# Patient Record
(demographics unavailable — no encounter records)

---

## 2025-03-04 NOTE — ADDENDUM
[FreeTextEntry1] : Entered by Diaz Sargent, acting as scribe for Dr. Crow Iglesias. The documentation recorded by the scribe accurately reflects the service I personally performed and the decisions made by me.

## 2025-03-04 NOTE — LETTER BODY
[FreeTextEntry1] : Thelma Dan [place] [id number] [place] [address] [phone]  Dear Dr. Dan,  Reason for visit: Microscopic hematuria  This is a 69 year-old man with microscopic hematuria. His hematuria evaluation in May 2024 was negative for hematuria. Patient returns today for follow-up. Since he was last seen, he denies any gross hematuria, dysuria or urinary incontinence. The patient denies any aggravating or relieving factors. The patient denies any interference of function. The patient is entirely asymptomatic. All other review of systems are negative. He has no cancer in her family medical history. He has no previous surgical history. Past medical history, family history and social history were inquired and were noncontributory to current condition. The patient does not use tobacco or drink alcohol. Medications and allergies were reviewed. He has no known allergies to medication. Patient is an .  On examination, the patient is a well-appearing gentleman in no acute distress. He is alert and oriented and follows commands. He has normal mood and affect. He is normocephalic. Oral no thrush. Neck is supple. Respirations are unlabored. His abdomen is soft and nontender. Liver is nonpalpable. Bladder is nonpalpable. No CVA tenderness. Neurologically he is grossly intact. No peripheral edema. Skin without gross abnormality.    Assessment: Microscopic hematuria.  I counseled the patient. His hematuria evaluation last year was negative. I recommended the patient repeat PSA, BMP, and urinalysis today to establish baseline. I answered the patient's questions. The risks and expected outcomes were discussed. The patient will follow up as directed and contact me with any questions or concerns. Thank you for the opportunity to participate in the care of Mr. KIM. I will keep you updated on his progress.  Plan: PSA. BMP. Urinalysis. Follow up in 1 year.  I spent 20-minutes time today on all issues related to this encounter on today date of service including non face to face time.

## 2025-03-04 NOTE — HISTORY OF PRESENT ILLNESS
[FreeTextEntry1] : Follow-up for microscopic hematuria, negative studies in May 2024, asymptomatic PSA 1.14 in May 2024 Compliant of insomnia at night, suggest to follow up with PCP   Please refer to [facility] Consult Note.
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
## 2025-03-23 NOTE — REASON FOR VISIT
[FreeTextEntry1] : 67-year-old male with AFIB, stroke s/p thrombectomy, LV dysfunction EF 40%, aortic aneurysm (4.5 cm), HTN, gout, presents for followup.    Patient was last seen on 7/6/23 -> Pt is here for cardiac clearance prior to right knee surgery on 7/20/23 with Dr. Ignacio. Patient has been stable. Patient denies CP, SOB, or palpitations. ECG showed AFIB with no ischemic changes. Recent echo showed normal LV function. Patient is cleared for surgery and anesthesia. He may hold Eliquis and Farxiga for 3 days prior to surgery.  Pt is on Eliquis 5 mg BID and Atorvastatin 40 mg for stroke prevention. He is on Carvedilol 6.25 mg BID, Losartan 50 mg for HTN. He is on Farxiga for LV dysfunction.    Patient underwent an echocardiogram 2/23/23  and it showed normal LV function, marked LAE, moderate LADARIUS, mild aortic root dilatation (4.4 cm), without significant valvular pathology.   Patient underwent an echocardiogram 3/4/22 and it showed mild-moderate LV dysfunction, moderate LVH, marked LA and RA enlargement, without significant valvular pathology.   Patient underwent a treadmill stress test 3/4/22 and completed 6 minutes of Jaya protocol. There was no ECG evidence of ischemia. Following treadmill stress, there was no echocardiographic evidence of ischemia.   Patient underwent an echocardiogram 7/6/17 and it showed moderate global LV dysfunction with EF of 35%.     He underwent a pharmacologic nuclear stress test 7/28/17 and it showed normal myocardial perfusion with EF 40%.

## 2025-03-23 NOTE — DISCUSSION/SUMMARY
[FreeTextEntry1] : 67-year-old male with AFIB, stroke s/p thrombectomy, LV dysfunction EF 40%, aortic aneurysm (4.5 cm), HTN, gout, presents for followup.  \par  \par  Patient was last seen on 5/24/23 - Patient is planning right knee surgery. Pt denies CP, SOB, or palpitations. Pt reports dizziness/lightheadedness once in a while. \par  \par  He is on Eliquis 5 mg BID and Atorvastatin 40 mg for stroke prevention. He is on Carvedilol 6.25 mg BID, Amlodipine 2.5 mg QD, Losartan 50 mg for HTN.  \par  \par  Patient underwent an echocardiogram 2/23/23  and it showed normal LV function, marked LAE, moderate LADARIUS, mild aortic root dilatation (4.4 cm), without significant valvular pathology. \par  \par  Patient underwent an echocardiogram 3/4/22 and it showed mild-moderate LV dysfunction, moderate LVH, marked LA and RA enlargement, without significant valvular pathology. \par  \par  Patient underwent a treadmill stress test 3/4/22 and completed 6 minutes of Jaya protocol. There was no ECG evidence of ischemia. Following treadmill stress, there was no echocardiographic evidence of ischemia. \par  \par  Patient underwent an echocardiogram 7/6/17 and it showed moderate global LV dysfunction with EF of 35%.   \par  \par  He underwent a pharmacologic nuclear stress test 7/28/17 and it showed normal myocardial perfusion with EF 40%. \par  \par  7/6/23 - Pt is here for cardiac clearance prior to right knee surgery on 7/20/23 with Dr. Ignacio.  Patient has been stable.  Patient denies CP, SOB, or palpitations.  ECG showed AFIB with no ischemic changes.  Recent echo showed normal LV function.  Patient is cleared for surgery and anesthesia.  He may hold Eliquis and Farxiga for 3 days prior to surgery.\par  \par  (1) Cardiac clearance prior to knee surgery - Patient has stable AFIB.  Patient denies exertional symptoms.  Patient underwent an echocardiogram 2/23/23  and it showed normal LV function, marked LAE, moderate LADARIUS, mild aortic root dilatation (4.4 cm), without significant valvular pathology.  Patient is cleared for surgery and anesthesia.  He may hold Eliquis and Farxiga for 3 days prior to surgery.\par  \par  (2) AFIB - Patient has been stable.  I advised patient to continue Eliquis 5 mg BID for stroke prevention and Carvedilol 6.25 mg BID for rate control.  \par  \par  (3) LV dysfunction - His LV dysfunction has resolved.  I advised patient to continue Carvedilol 6.25 mg BID and Losartan 50 mg to preserve LV function.\par  \par  (4) Aortic aneurysm - Patient was noted to have mild aortic root dilatation (4.4 cm) on 2/23/23 echo.  Patient should have a followup echo in Feb 2024.\par  \par  (5) Followup - 3 months. \par   [EKG obtained to assist in diagnosis and management of assessed problem(s)] : EKG obtained to assist in diagnosis and management of assessed problem(s)

## 2025-03-23 NOTE — PHYSICAL EXAM
[General Appearance - Well Developed] : well developed [Normal Appearance] : normal appearance [Well Groomed] : well groomed [General Appearance - Well Nourished] : well nourished [No Deformities] : no deformities [General Appearance - In No Acute Distress] : no acute distress [Normal Conjunctiva] : the conjunctiva exhibited no abnormalities [Eyelids - No Xanthelasma] : the eyelids demonstrated no xanthelasmas [Normal Oral Mucosa] : normal oral mucosa [No Oral Pallor] : no oral pallor [No Oral Cyanosis] : no oral cyanosis [Normal Jugular Venous A Waves Present] : normal jugular venous A waves present [Normal Jugular Venous V Waves Present] : normal jugular venous V waves present [No Jugular Venous Bojorquez A Waves] : no jugular venous bojorquez A waves [Respiration, Rhythm And Depth] : normal respiratory rhythm and effort [Exaggerated Use Of Accessory Muscles For Inspiration] : no accessory muscle use [Auscultation Breath Sounds / Voice Sounds] : lungs were clear to auscultation bilaterally [Heart Sounds] : normal S1 and S2 [Murmurs] : no murmurs present [Arterial Pulses Normal] : the arterial pulses were normal [Edema] : no peripheral edema present [Irregularly Irregular] : the rhythm was irregularly irregular [Abdomen Soft] : soft [Abdomen Tenderness] : non-tender [Abdomen Mass (___ Cm)] : no abdominal mass palpated [Nail Clubbing] : no clubbing of the fingernails [Cyanosis, Localized] : no localized cyanosis [Petechial Hemorrhages (___cm)] : no petechial hemorrhages [] : no ischemic changes [Oriented To Time, Place, And Person] : oriented to person, place, and time [Affect] : the affect was normal [Mood] : the mood was normal [No Anxiety] : not feeling anxious [FreeTextEntry1] : limited mobility due to foot pain